# Patient Record
(demographics unavailable — no encounter records)

---

## 2024-11-13 NOTE — PHYSICAL EXAM
[de-identified] : right facial flap with dry necrotic eschar, right lateral incision with prineo in place, patient unable to completely close right eyelid. post op edema

## 2024-11-13 NOTE — HISTORY OF PRESENT ILLNESS
[FreeTextEntry1] :  Pt is a 86 year female s/p right cervical facial flap DOS 10/28/24.  ID# [id number]. Patient accompanied by family member. Pt is using ophthalmic eyedrop and ointment everyday. No further complaints at this time. Denies fever, chills, pain, rash.

## 2024-11-19 NOTE — HISTORY OF PRESENT ILLNESS
[FreeTextEntry1] : Pt is a 86 year female s/p right cervical facial flap DOS 10/28/24. Patient accompanied by family member. Pt doing well. She is applying mupirocin ointment on incision and ophthalmic ointment/cream. No further complaints at this time. Denies fever, chills, pain, rash.

## 2024-11-19 NOTE — PHYSICAL EXAM
[de-identified] : right facial flap with necrotic eschar x2, right lateral facial incision healed well, patient unable to completely close right eyelid. post op edema

## 2024-11-26 NOTE — PHYSICAL EXAM
[de-identified] :  right facial flap with necrotic eschar x2, betadine applied, right lateral facial incision healed well, patient unable to completely close right eyelid. post op edema

## 2024-11-26 NOTE — HISTORY OF PRESENT ILLNESS
[FreeTextEntry1] : Pt is a 86 year female s/p right cervical facial flap DOS 10/28/24. Patient accompanied by family member. Pt doing well. She is applying mupirocin ointment on incision and ophthalmic ointment/cream. Reported some facial scab started lifting. No further complaints at this time. Denies fever, chills, pain, rash.
Simple: Patient demonstrates quick and easy understanding

## 2024-12-03 NOTE — REASON FOR VISIT
[Post-Operative Visit] : a post-operative visit [Other: _____] : [unfilled] [Other: ______] : provided by PRINCESS [Interpreters_IDNumber] : [id number] [Interpreters_FullName] : Rashi [TWNoteComboBox1] : Tanzanian

## 2024-12-03 NOTE — PHYSICAL EXAM
[Midline] : trachea located in midline position [Normal] : no rashes [FreeTextEntry2] : 2.5 cm R cheek lesion

## 2024-12-03 NOTE — PLAN
[TextEntry] : - H/O R facial lesion with recent biopsy showing SCCa. Seems like she had a lesion in that area [place]. - No palpable LNs. Scan was reviewed today and seems to be superficial. - S/P WLE and reconstruction with  cervico facial flap on 10/28/24. [id number] SCCa with perineural invasion of nerve bundle less than 0.1 mm. Margin was negative. - Patient with superficial necrosis or a few areas of the flap being manages by PRS team.  - Since it was a T2 with negative margins and only microscopic invasion of small nerve bundle I feel like she will not need adjuvant treatment.  - Return in 4 weeks.

## 2024-12-03 NOTE — HISTORY OF PRESENT ILLNESS
[FreeTextEntry1] : Pt is a 86 year female s/p right cervical facial flap DOS 10/28/24. Patient accompanied by family member. Pt doing well. She is applying betadine to necrotic eschar and ophthalmic ointment/cream. Reported some facial scab started lifting. No further complaints at this time. Denies fever, chills, pain, rash.

## 2024-12-03 NOTE — PHYSICAL EXAM
[de-identified] : right facial flap with dry necrotic eschar x2, facial incisions healed well, patient unable to completely close right eyelid however improved from last week. post op edema improving

## 2024-12-03 NOTE — HISTORY OF PRESENT ILLNESS
[de-identified] : 86 yro pt referred by Dr. Maxime Miles for surgical eval of R cheek lesion, biopsy 9/9/24 showed SCC.  Daughter states the R cheek lesion was first tested in [facility] about 7-8 yrs ago.  She is unsure of what was done,  Pt c/o R cheek lesion pain earlier this year treated with abx cream by the PCP which did not help. She went to [facility] in [place], CT scan was done and pt advised to see derm.  Per derm consult notes, "pt had a previous shave bx on 8/21/24 with Dr. Lennon that showed findings concerning for at least SCC in situ, cannot r/o invasive carcinoma. Additionally an  addenedum was issued to the biopsy commending on a proliferation of dendritic melanocytes and repeat biopsy was recommended to confirm diagnosis and plan treatment." New biopsy done 9/9/24 by Vivi jeff showed SCC.  Pt also had h/o of surgery on her lower eyelid to remove a lesion abt 6-7 years ago in [facility]. Pt has had ectropion since then, getting worse the cheek tumor is enlarging. S/P  Wide local excision of squamous cell carcinoma of the right face, specimen measuring 5.5 x 5 cm and Reconstruction with right cervicofacial flap measuring 15 x 8 cm, right lower eyelid Lateral Tarsal Strip with co-surgeon Dr. Marquez on 10/28/24. Per daughter, pt seeing Dr. Marquez every 8 days.  Daughter reports pt doing well. Daughter applying 10% povidone-iodine (betadine) to facial wound 2x/ day and Refresh lacri-lube eye ointment at night, and eyedrops 4x/day. Pt reports the skin feels stiff/hard. No pain or bleeding, fever or chills.   Eating and drinking without issues.    Pathology 10/28/24: Specimen(s) Submitted 1-Right facial lesion.  Final Diagnosis Right facial lesion, excision - Squamous cell carcinoma, moderately differentiated - 3.0 cm in size (by gross), depth of invasion 4.3 mm, approaching subcutis  - Perineural invasion seen, nerve bundle less than 0.1 mm in diameter - No vascular invasion seen - Margins negative (2 mm from the closest deep margin)

## 2024-12-03 NOTE — REASON FOR VISIT
[Post-Operative Visit] : a post-operative visit [Other: _____] : [unfilled] [Other: ______] : provided by PRINCESS [Interpreters_IDNumber] : [id number] [Interpreters_FullName] : Rashi [TWNoteComboBox1] : [place] Island[place]

## 2024-12-03 NOTE — PLAN
[TextEntry] : - H/O R facial lesion with recent biopsy showing SCCa. Seems like she had a lesion in that area [place] in [place] country [place] ago. - No palpable LNs. Scan was reviewed today and seems to be superficial. - S/P WLE and reconstruction with  cervico facial flap on 10/28/24. [id number] SCCa with perineural invasion of nerve bundle less than 0.1 mm. Margin was negative. - Patient with superficial necrosis or a few areas of the flap being manages by PRS team.  - Since it was a T2 with negative margins and only microscopic invasion of small nerve bundle I feel like she will not need adjuvant treatment.  - Return in 4 weeks.

## 2024-12-03 NOTE — HISTORY OF PRESENT ILLNESS
[de-identified] : 86 yro pt referred by Dr. Maxime Miles for surgical eval of R cheek lesion, biopsy 9/9/24 showed SCC.  Daughter states the R cheek lesion was first tested in [facility] about 7-8 yrs ago.  She is unsure of what was done,  Pt c/o R cheek lesion pain earlier this year treated with abx cream by the PCP which did not help. She went to [facility] in [place], CT scan was done and pt advised to see derm.  Per derm consult notes, "pt had a previous shave bx on 8/21/24 with Dr. Lennon that showed findings concerning for at least SCC in situ, cannot r/o invasive carcinoma. Additionally an  addenedum was issued to the biopsy commending on a proliferation of dendritic melanocytes and repeat biopsy was recommended to confirm diagnosis and plan treatment." New biopsy done 9/9/24 by Vivi jeff showed SCC.  Pt also had h/o of surgery on her lower eyelid to remove a lesion abt 6-7 years ago in [facility]. Pt has had ectropion since then, getting worse the cheek tumor is enlarging. S/P  Wide local excision of squamous cell carcinoma of the right face, specimen measuring 5.5 x 5 cm and Reconstruction with right cervicofacial flap measuring 15 x 8 cm, right lower eyelid Lateral Tarsal Strip with co-surgeon Dr. Marquez on 10/28/24. Per daughter, pt seeing Dr. Marquez every 8 days.  Daughter reports pt doing well. Daughter applying 10% povidone-iodine (betadine) to facial wound 2x/ day and Refresh lacri-lube eye ointment at night, and eyedrops 4x/day. Pt reports the skin feels stiff/hard. No pain or bleeding, fever or chills.   Eating and drinking without issues.    Pathology 10/28/24: Specimen(s) Submitted 1-Right facial lesion.  Final Diagnosis Right facial lesion, excision - Squamous cell carcinoma, moderately differentiated - 3.0 cm in size (by gross), depth of invasion 4.3 mm, approaching subcutis  - Perineural invasion seen, nerve bundle less than 0.1 mm in diameter - No vascular invasion seen - Margins negative (2 mm from the closest deep margin)

## 2024-12-24 NOTE — PLAN
[TextEntry] : - H/O R facial lesion with recent biopsy showing SCCa. Seems like she had a lesion in that area [place] in [place]. - No palpable LNs. Scan was reviewed today and seems to be superficial. - S/P WLE and reconstruction with  cervico facial flap on 10/28/24. [id number] SCCa with perineural invasion of nerve bundle less than 0.1 mm. Margin was negative. - Patient with superficial necrosis or a few areas of the flap being manages by PRS team. This is much better today. - Since it was a T2 with negative margins and only microscopic invasion of small nerve bundle I feel like she will not need adjuvant treatment. Case was presented at the tumor board and recommendation was for surveillance with no adjuvant treatment at this time. - Return in 2 months.

## 2024-12-24 NOTE — PLAN
[TextEntry] : - H/O R facial lesion with recent biopsy showing SCCa. Seems like she had a lesion in that area [place]. - No palpable LNs. Scan was reviewed today and seems to be superficial. - S/P WLE and reconstruction with  cervico facial flap on 10/28/24. [id number] SCCa with perineural invasion of nerve bundle less than 0.1 mm. Margin was negative. - Patient with superficial necrosis or a few areas of the flap being manages by PRS team. This is much better today. - Since it was a T2 with negative margins and only microscopic invasion of small nerve bundle I feel like she will not need adjuvant treatment. Case was presented at the tumor board and recommendation was for surveillance with no adjuvant treatment at this time. - Return in 2 months.

## 2024-12-24 NOTE — HISTORY OF PRESENT ILLNESS
[de-identified] : 86 yro pt referred by Dr. Maxime Miles for surgical eval of R cheek lesion, biopsy 9/9/24 showed SCC.  Daughter states the R cheek lesion was first tested in [facility] about 7-8 yrs ago.  She is unsure of what was done,  Pt c/o R cheek lesion pain earlier this year treated with abx cream by the PCP which did not help. She went to [facility] in [place], CT scan was done and pt advised to see derm.  Per derm consult notes, "pt had a previous shave bx on 8/21/24 with Dr. Lennon that showed findings concerning for at least SCC in situ, cannot r/o invasive carcinoma. Additionally an  addenedum was issued to the biopsy commending on a proliferation of dendritic melanocytes and repeat biopsy was recommended to confirm diagnosis and plan treatment." New biopsy done 9/9/24 by Vivi jeff showed SCC.  Pt also had h/o of surgery on her lower eyelid to remove a lesion abt 6-7 years ago in [facility]. Pt has had ectropion since then, getting worse the cheek tumor is enlarging. S/P  Wide local excision of squamous cell carcinoma of the right face, specimen measuring 5.5 x 5 cm and Reconstruction with right cervicofacial flap measuring 15 x 8 cm, right lower eyelid Lateral Tarsal Strip with co-surgeon Dr. Marquez on 10/28/24. Today daughter reports pt doing well. Daughter still applying 10% povidone-iodine (betadine) to facial wound 2x/ day and Refresh lacri-lube eye ointment eyedrops.  No pain or bleeding, fever or chills.   Eating and drinking without issues.

## 2024-12-24 NOTE — PHYSICAL EXAM
[de-identified] : right facial flap with dry necrotic eschar x2, facial incisions healed, patient unable to completely close right eyelid however improved from last week. post op edema improving

## 2024-12-24 NOTE — HISTORY OF PRESENT ILLNESS
[de-identified] : 86 yro pt referred by Dr. Maxime Miles for surgical eval of R cheek lesion, biopsy 9/9/24 showed SCC.  Daughter states the R cheek lesion was first tested in [facility] about 7-8 yrs ago.  She is unsure of what was done,  Pt c/o R cheek lesion pain earlier this year treated with abx cream by the PCP which did not help. She went to [facility] in [place], CT scan was done and pt advised to see derm.  Per derm consult notes, "pt had a previous shave bx on 8/21/24 with Dr. Lennon that showed findings concerning for at least SCC in situ, cannot r/o invasive carcinoma. Additionally an  addenedum was issued to the biopsy commending on a proliferation of dendritic melanocytes and repeat biopsy was recommended to confirm diagnosis and plan treatment." New biopsy done 9/9/24 by Vivi jeff showed SCC.  Pt also had h/o of surgery on her lower eyelid to remove a lesion abt 6-7 years ago in [facility]. Pt has had ectropion since then, getting worse the cheek tumor is enlarging. S/P  Wide local excision of squamous cell carcinoma of the right face, specimen measuring 5.5 x 5 cm and Reconstruction with right cervicofacial flap measuring 15 x 8 cm, right lower eyelid Lateral Tarsal Strip with co-surgeon Dr. Marquez on 10/28/24. Today daughter reports pt doing well. Daughter still applying 10% povidone-iodine (betadine) to facial wound 2x/ day and Refresh lacri-lube eye ointment eyedrops.  No pain or bleeding, fever or chills.   Eating and drinking without issues.

## 2025-01-14 NOTE — PHYSICAL EXAM
[de-identified] : right facial flap with dry necrotic eschar x2 removed, facial incisions healed, patient unable to completely close right eyelid however improved from last week. post op edema improving

## 2025-02-04 NOTE — HISTORY OF PRESENT ILLNESS
[FreeTextEntry1] :  Pt is a 86 year female s/p right lower eyelid canthoplasty with FTSG DOS 1/30/25. Patient accompanied by family member. Pt doing well. Daughter reported she is able to close her eyes. No further complaints at this time. Denies fever, chills, pain, rash.

## 2025-02-04 NOTE — PHYSICAL EXAM
[de-identified] : right lower eyelid bolster removed today, FTSG with good adherence, right eyelid mostly closed, site clean, no bleeding/drainage noted, no surrounding erythema [de-identified] : right clavicular incision with steri strips in place

## 2025-02-18 NOTE — HISTORY OF PRESENT ILLNESS
[FreeTextEntry1] : Pt is a 86 year female s/p right lower eyelid canthoplasty with FTSG DOS 1/30/25. Patient accompanied by family member. Family member reported eye mostly closed. c/o numbness along incision. No further complaints at this time. Denies fever, chills, pain, rash.

## 2025-02-18 NOTE — PHYSICAL EXAM
[de-identified] : right eye mostly closed, mild ectropion, FTSG healed well dressed with vaseline, skin graft fully adhered, right facial incision healed well

## 2025-02-25 NOTE — HISTORY OF PRESENT ILLNESS
[de-identified] : 86 yro pt referred by Dr. Maxime Miles for surgical eval of R cheek lesion, biopsy 9/9/24 showed SCC.  Daughter states the R cheek lesion was first tested in [facility] about 7-8 yrs ago.  She is unsure of what was done,  Pt c/o R cheek lesion pain earlier this year, CT scan was done and pt advised to see derm.  New biopsy done 9/9/24 by Vivi jeff showed SCC.  Pt also had h/o of surgery on her lower eyelid to remove a lesion abt 6-7 years ago in [facility]. Pt has had ectropion since then. S/P  Wide local excision of squamous cell carcinoma of the right face, specimen measuring 5.5 x 5 cm and Reconstruction with right cervicofacial flap measuring 15 x 8 cm, right lower eyelid Lateral Tarsal Strip with co-surgeon Dr. Marquez on 10/28/24. s/p right lower eyelid canthoplasty with FTSG with Dr. Marquez on 1/30/25

## 2025-02-25 NOTE — PHYSICAL EXAM
[Midline] : trachea located in midline position [Normal] : no rashes [FreeTextEntry2] : Healing with a 3 cm R infraorbital scar.

## 2025-02-25 NOTE — REASON FOR VISIT
[Subsequent Evaluation] : a subsequent evaluation for [FreeTextEntry2] : SCCa facial lesion  [Other: _____] : [unfilled] [Other: ______] : provided by PRINCESS [TWNoteComboBox1] : Czech

## 2025-02-25 NOTE — PLAN
[TextEntry] : - H/O R facial lesion with recent biopsy showing SCCa. Seems like she had a lesion in that area [place]. - No palpable LNs. Scan was reviewed today and seems to be superficial. - S/P WLE and reconstruction with  cervico facial flap on 10/28/24. [id number] SCCa with perineural invasion of nerve bundle less than 0.1 mm. Margin was negative. - Patient with superficial necrosis or a few areas of the flap being manages by PRS team. This is much better today. - Since it was a T2 with negative margins and only microscopic invasion of small nerve bundle I feel like she will not need adjuvant treatment. Case was presented at the tumor board and recommendation was for surveillance with no adjuvant treatment at this time. - Return in 2 months.

## 2025-03-25 NOTE — PHYSICAL EXAM
[de-identified] : right eye mostly closed with mild ectropion secondary to scar tethering, skin graft fully adhered, thick scar noted underneath facial incisions
No

## 2025-03-25 NOTE — HISTORY OF PRESENT ILLNESS
[FreeTextEntry1] : Pt is a 86 year female s/p right lower eyelid canthoplasty with FTSG DOS 1/30/25. Patient accompanied by family member. Pt reported intermittent sharp pains from incision sites. denies massaging incision site. No further complaints at this time. Denies fever, chills, pain, rash.

## 2025-04-22 NOTE — PLAN
[TextEntry] : - H/O R facial lesion with recent biopsy showing SCCa. Seems like she had a lesion in that area [place]. - No palpable LNs. Scan was reviewed today and seems to be superficial. - S/P WLE and reconstruction with  cervico facial flap on 10/28/24. [id number] SCCa with perineural invasion of nerve bundle less than 0.1 mm. Margin was negative. - Patient with superficial necrosis or a few areas of the flap being manages by PRS team. This is much better today. - Since it was a T2 with negative margins and only microscopic invasion of small nerve bundle I feel like she will not need adjuvant treatment. Case was presented at the tumor board and recommendation was for surveillance with no adjuvant treatment at this time. - Will request a new MRI. Will refer to SLP for lymphedema treatment. - Continue eye care. - Return in 2 months.

## 2025-04-22 NOTE — HISTORY OF PRESENT ILLNESS
[de-identified] : 86 yro pt referred by Dr. Maxime Miles for surgical eval of R cheek lesion, biopsy 9/9/24 showed SCC.  Daughter states the R cheek lesion was first tested in [facility] about 7-8 yrs ago.  She is unsure of what was done,  Pt c/o R cheek lesion pain earlier this year, CT scan was done and pt advised to see derm.  New biopsy done 9/9/24 by Vivi jeff showed SCC.  Pt also had h/o of surgery on her lower eyelid to remove a lesion abt 6-7 years ago in [facility]. Pt has had ectropion since then. S/P  Wide local excision of squamous cell carcinoma of the right face, specimen measuring 5.5 x 5 cm and Reconstruction with right cervicofacial flap measuring 15 x 8 cm, right lower eyelid Lateral Tarsal Strip with co-surgeon Dr. Marquez on 10/28/24. s/p right lower eyelid canthoplasty with FTSG with Dr. Marquze on 1/30/25  PT states she feels like the right of the face is "alien" and not her own. Occasional pain at the facial surgical site, sometimes waking up with pain. Keeping facial area clean and dry. Still using eye drops.

## 2025-04-22 NOTE — PHYSICAL EXAM
[de-identified] : right eye mostly closed with mild ectropion secondary to scar tethering, skin graft fully adhered, thick scar noted underneath facial incisions

## 2025-04-22 NOTE — REASON FOR VISIT
[Subsequent Evaluation] : a subsequent evaluation for [Other: _____] : [unfilled] [Language Line ] : provided by Language Line   [FreeTextEntry2] : SCCa facial lesion  [Interpreters_IDNumber] : [id number] [Interpreters_FullName] : Asya  [TWNoteComboBox1] : Croatian

## 2025-07-01 NOTE — PLAN
[TextEntry] : - H/O R facial lesion with recent biopsy showing SCCa. Seems like she had a lesion in that area [place]. - No palpable LNs. Scan was reviewed today and seems to be superficial. - S/P WLE and reconstruction with  cervico facial flap on 10/28/24. [id number] SCCa with perineural invasion of nerve bundle less than 0.1 mm. Margin was negative. - Patient with superficial necrosis or a few areas of the flap being manages by PRS team. This is much better today. - Since it was a T2 with negative margins and only microscopic invasion of small nerve bundle I feel like she will not need adjuvant treatment. Case was presented at the tumor board and recommendation was for surveillance with no adjuvant treatment at this time. - MRI showing new peripherally enhancing lesions in right level 1 and in the right parotid tail, suspicious for metastatic lymph nodes - Will request a PET CT and USFNA. - Return with results. - Return in 2 months.

## 2025-07-01 NOTE — REASON FOR VISIT
[Subsequent Evaluation] : a subsequent evaluation for [Other: _____] : [unfilled] [Language Line ] : provided by Language Line   [FreeTextEntry2] : SCCa facial lesion  [Interpreters_IDNumber] : [id number] [Interpreters_FullName] : Becky [TWNoteComboBox1] : [place]

## 2025-07-01 NOTE — HISTORY OF PRESENT ILLNESS
[de-identified] : 86 yro pt referred by Dr. Maxime Miles for surgical eval of R cheek lesion, biopsy 9/9/24 showed SCC.  Daughter states the R cheek lesion was first tested in [facility] about 7-8 yrs ago.  She is unsure of what was done,  Pt c/o R cheek lesion pain earlier this year, CT scan was done and pt advised to see derm.  New biopsy done 9/9/24 by Vivi jeff showed SCC.  Pt also had h/o of surgery on her lower eyelid to remove a lesion abt 6-7 years ago in [facility]. Pt has had ectropion since then. S/P  Wide local excision of squamous cell carcinoma of the right face, specimen measuring 5.5 x 5 cm and Reconstruction with right cervicofacial flap measuring 15 x 8 cm, right lower eyelid Lateral Tarsal Strip with co-surgeon Dr. Marquez on 10/28/24. s/p right lower eyelid canthoplasty with FTSG with Dr. Marquez on 1/30/25.   Pt c/o R facial pain.  She is not taking any medication for the pain. Pt had physical therapy for 4 weeks at [facility] in [place].  No improvement.  MRI 6/18/25: IMPRESSION:  -Interval decrease in size of right premaxillary soft tissue lesion.  -However, new peripherally enhancing lesions are seen in right level 1 and in the right parotid tail, suspicious for metastatic lymph nodes. Tissue sampling should be considered for further evaluation.  -Grossly stable right anterior clinoid process 1.1 cm meningioma.

## 2025-07-01 NOTE — PHYSICAL EXAM
[Midline] : trachea located in midline position [Normal] : no rashes [FreeTextEntry2] : No palpable lesions on exam.

## 2025-07-11 NOTE — HISTORY OF PRESENT ILLNESS
[FreeTextEntry1] : Pt is a 86 year female s/p right lower eyelid canthoplasty with FTSG DOS 1/30/25.   Patient accompanied by family member.  Reported lower eyelid remains unchanged, c/o dry eyes requesting ophthalmic ointment/eyedrops Pt was recently seen by MAHENDRA No further complaints at this time. Pt denies cp, sob, subjective fever, chills, headache, cough, myalgia, malaise, abdominal pain, N/V/D, decreased appetite and generalized weakness

## 2025-07-11 NOTE — PHYSICAL EXAM
[de-identified] : right eye mostly closed with mild ectropion secondary to scar tethering, skin graft fully adhered, thick scar noted underneath facial incisions

## 2025-07-11 NOTE — ASSESSMENT
[FreeTextEntry1] : Pt s/p right lower eyelid canthoplasty with FTSG DOS 1/30/25.  Discuss importance of light massaging to scar along skin graft to improve skin laxity Continue with ophthalmic ointment and eyedrops nightly, rx sent Right eyelid mostly close, mild ectropion F/u 1 month  Patient seen with Dr. Marquez